# Patient Record
Sex: MALE | Race: BLACK OR AFRICAN AMERICAN | Employment: UNEMPLOYED | URBAN - METROPOLITAN AREA
[De-identification: names, ages, dates, MRNs, and addresses within clinical notes are randomized per-mention and may not be internally consistent; named-entity substitution may affect disease eponyms.]

---
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
## 2022-12-01 ENCOUNTER — APPOINTMENT (OUTPATIENT)
Dept: GENERAL RADIOLOGY | Age: 22
End: 2022-12-01
Payer: COMMERCIAL

## 2022-12-01 ENCOUNTER — HOSPITAL ENCOUNTER (EMERGENCY)
Age: 22
Discharge: HOME | End: 2022-12-01
Attending: EMERGENCY MEDICINE
Payer: COMMERCIAL

## 2022-12-01 VITALS
TEMPERATURE: 98.4 F | OXYGEN SATURATION: 100 % | SYSTOLIC BLOOD PRESSURE: 144 MMHG | DIASTOLIC BLOOD PRESSURE: 90 MMHG | RESPIRATION RATE: 12 BRPM | HEART RATE: 65 BPM

## 2022-12-01 DIAGNOSIS — R55 SYNCOPE AND COLLAPSE: Primary | ICD-10-CM

## 2022-12-01 LAB
ABSOLUTE EOS #: 0.04 K/UL (ref 0–0.44)
ABSOLUTE IMMATURE GRANULOCYTE: <0.03 K/UL (ref 0–0.3)
ABSOLUTE LYMPH #: 2.06 K/UL (ref 1.1–3.7)
ABSOLUTE MONO #: 0.39 K/UL (ref 0.1–1.2)
ANION GAP SERPL CALCULATED.3IONS-SCNC: 17 MMOL/L (ref 9–17)
BASOPHILS # BLD: 0 % (ref 0–2)
BASOPHILS ABSOLUTE: 0.03 K/UL (ref 0–0.2)
BUN BLDV-MCNC: 14 MG/DL (ref 6–20)
CALCIUM SERPL-MCNC: 9.5 MG/DL (ref 8.6–10.4)
CHLORIDE BLD-SCNC: 101 MMOL/L (ref 98–107)
CHP ED QC CHECK: YES
CO2: 17 MMOL/L (ref 20–31)
CREAT SERPL-MCNC: 0.76 MG/DL (ref 0.7–1.2)
EOSINOPHILS RELATIVE PERCENT: 1 % (ref 1–4)
GFR SERPL CREATININE-BSD FRML MDRD: >60 ML/MIN/1.73M2
GLUCOSE BLD-MCNC: 92 MG/DL
GLUCOSE BLD-MCNC: 92 MG/DL (ref 75–110)
GLUCOSE BLD-MCNC: 95 MG/DL (ref 70–99)
HCT VFR BLD CALC: 43.2 % (ref 40.7–50.3)
HEMOGLOBIN: 14 G/DL (ref 13–17)
IMMATURE GRANULOCYTES: 0 %
LYMPHOCYTES # BLD: 31 % (ref 24–43)
MCH RBC QN AUTO: 29.7 PG (ref 25.2–33.5)
MCHC RBC AUTO-ENTMCNC: 32.4 G/DL (ref 28.4–34.8)
MCV RBC AUTO: 91.7 FL (ref 82.6–102.9)
MONOCYTES # BLD: 6 % (ref 3–12)
NRBC AUTOMATED: 0 PER 100 WBC
PDW BLD-RTO: 14 % (ref 11.8–14.4)
PLATELET # BLD: 193 K/UL (ref 138–453)
PMV BLD AUTO: 10.6 FL (ref 8.1–13.5)
POTASSIUM SERPL-SCNC: 3.6 MMOL/L (ref 3.7–5.3)
RBC # BLD: 4.71 M/UL (ref 4.21–5.77)
SEG NEUTROPHILS: 62 % (ref 36–65)
SEGMENTED NEUTROPHILS ABSOLUTE COUNT: 4.16 K/UL (ref 1.5–8.1)
SODIUM BLD-SCNC: 135 MMOL/L (ref 135–144)
TROPONIN, HIGH SENSITIVITY: <6 NG/L (ref 0–22)
WBC # BLD: 6.7 K/UL (ref 3.5–11.3)

## 2022-12-01 PROCEDURE — 99284 EMERGENCY DEPT VISIT MOD MDM: CPT

## 2022-12-01 PROCEDURE — 84484 ASSAY OF TROPONIN QUANT: CPT

## 2022-12-01 PROCEDURE — 71046 X-RAY EXAM CHEST 2 VIEWS: CPT

## 2022-12-01 PROCEDURE — 85025 COMPLETE CBC W/AUTO DIFF WBC: CPT

## 2022-12-01 PROCEDURE — 82947 ASSAY GLUCOSE BLOOD QUANT: CPT

## 2022-12-01 PROCEDURE — 80048 BASIC METABOLIC PNL TOTAL CA: CPT

## 2022-12-01 PROCEDURE — 6370000000 HC RX 637 (ALT 250 FOR IP): Performed by: STUDENT IN AN ORGANIZED HEALTH CARE EDUCATION/TRAINING PROGRAM

## 2022-12-01 RX ORDER — ACETAMINOPHEN 500 MG
1000 TABLET ORAL ONCE
Status: COMPLETED | OUTPATIENT
Start: 2022-12-01 | End: 2022-12-01

## 2022-12-01 RX ADMIN — ACETAMINOPHEN 1000 MG: 500 TABLET ORAL at 19:39

## 2022-12-01 NOTE — Clinical Note
Macario Singh was seen and treated in our emergency department on 12/1/2022. He may return to work on 12/03/2022. Or sooner if he feels better     If you have any questions or concerns, please don't hesitate to call.       Joan Jacinto MD

## 2022-12-02 NOTE — ED PROVIDER NOTES
[facility]  ED  Emergency Department Encounter  Emergency Medicine Resident     Pt Name: Анна Hoover  MRN: [id number]  [place] 2000  Date of evaluation: 12/1/22  PCP:  No primary care provider on file. CHIEF COMPLAINT       Chief Complaint   Patient presents with    Loss of Consciousness     Was at work when he began to [facility] and then states he woke up on the ground       HISTORY OFPRESENT ILLNESS  (Location/Symptom, Timing/Onset, Context/Setting, Quality, Duration, Modifying Factors,Severity.)      Анна Hoover is a 25 y.o. male who presents with syncopal episode at work. He says that he all of a sudden felt lightheaded with tunnel vision and then collapsed. He was unconscious for 10 seconds. No seizure-like activity, incontinence, postictal period. No history of seizures. He says that he did not eat breakfast that morning and he drank 2 energy drinks which is abnormal for him. He denies any chest pain, shortness of breath, nausea, vomiting, fevers, chills, headache or blurred vision. PAST MEDICAL / SURGICAL / SOCIAL / FAMILY HISTORY      has a past medical history of Vision disturbance. has a past surgical history that includes Circumcision. Social:  reports that he is a non-smoker but has been exposed to tobacco smoke. He has never used smokeless tobacco. He reports current drug use. Drug: Marijuana Samanhta Pleas). He reports that he does not drink alcohol.     Family Hx:   Family History   Problem Relation Age of Onset    Cancer Maternal Grandfather     Early Death Maternal Grandfather         cancer 2006    High Blood Pressure Paternal Grandmother     High Blood Pressure Paternal Grandfather     Arthritis Neg Hx     Asthma Neg Hx     Birth Defects Neg Hx     Depression Neg Hx     Diabetes Neg Hx     Hearing Loss Neg Hx     Heart Disease Neg Hx     High Cholesterol Neg Hx     Kidney Disease Neg Hx     Learning Disabilities Neg Hx     Mental Illness Neg Hx     Mental Retardation Neg Hx     Miscarriages / Stillbirths Neg Hx     Stroke Neg Hx     Substance Abuse Neg Hx     Vision Loss Neg Hx         Allergies:  Patient has no known allergies. Home Medications:  Prior to Admission medications    Medication Sig Start Date End Date Taking? Authorizing Provider   benzoyl peroxide 5 % gel Apply topically daily. 8/21/15   KAILEE Kemp CNP   ibuprofen (ADVIL;MOTRIN) 600 MG tablet Take 1 tablet by mouth every 8 hours as needed for Pain 8/21/15   KAILEE Kemp CNP   hydrocortisone 1 % ointment Apply topically 2 times daily to affected skin on his back for no more than 2 weeks at a time. 8/21/15   KAILEE Kemp CNP   clotrimazole (LOTRIMIN AF) 1 % cream Apply topically 2 times daily to pale skin patches on his face. Avoid the eyes. Use for no more than 2 weeks. 5/30/14   KAILEE Kemp CNP       REVIEW OFSYSTEMS    (2-9 systems for level 4, 10 or more for level 5)      Review of Systems   Constitutional:  Negative for appetite change, chills, fatigue and fever. HENT:  Negative for congestion, rhinorrhea, sneezing and sore throat. Eyes:  Negative for visual disturbance. Respiratory:  Negative for cough and shortness of breath. Cardiovascular:  Negative for chest pain and leg swelling. Gastrointestinal:  Negative for abdominal pain, diarrhea, nausea and vomiting. Genitourinary:  Negative for dysuria. Musculoskeletal:  Negative for myalgias, neck pain and neck stiffness. Skin:  Negative for rash and wound. Neurological:  Positive for syncope. Negative for dizziness, seizures, light-headedness and headaches. Psychiatric/Behavioral:  Negative for dysphoric mood and suicidal ideas. PHYSICAL EXAM   (up to 7 for level 4, 8 or more forlevel 5)      INITIAL VITALS:   Vitals:    12/01/22 2037   BP:    Pulse: 65   Resp: 12   Temp:    SpO2: 100%        Physical Exam  Vitals and nursing note reviewed. Constitutional:       General: He is not in acute distress. Appearance: Normal appearance. He is normal weight. He is not ill-appearing, toxic-appearing or diaphoretic. HENT:      Nose: Nose normal.      Mouth/Throat:      Mouth: Mucous membranes are moist.      Pharynx: Oropharynx is clear. Eyes:      Extraocular Movements: Extraocular movements intact. Conjunctiva/sclera: Conjunctivae normal.      Pupils: Pupils are equal, round, and reactive to light. Cardiovascular:      Rate and Rhythm: Normal rate and regular rhythm. Pulses: Normal pulses. Heart sounds: Normal heart sounds. Pulmonary:      Effort: Pulmonary effort is normal.      Breath sounds: Normal breath sounds. Abdominal:      General: There is no distension. Palpations: Abdomen is soft. Tenderness: There is no abdominal tenderness. There is no right CVA tenderness, left CVA tenderness or guarding. Musculoskeletal:         General: Normal range of motion. Cervical back: Normal range of motion and neck supple. Skin:     General: Skin is warm. Capillary Refill: Capillary refill takes less than 2 seconds. Neurological:      General: No focal deficit present. Mental Status: He is alert and oriented to person, place, and time. Psychiatric:         Mood and Affect: Mood normal.         Behavior: Behavior normal.       MEDICAL DECISION MAKING     Initial MDM/Plan: 25 y.o. male who presents with syncopal episode at work. No seizure-like activity. Vital signs reviewed and are notable for hypertension, otherwise within normal limits. Physical examination showing no trauma, normal heart rate, clear lungs and no focal neurological deficits. Suspect vasovagal syncope, however will obtain CBC, BMP, glucose and troponin and will reassess.                DIAGNOSTIC RESULTS / EMERGENCYDEPARTMENT COURSE / MDM     LABS:  Labs Reviewed   BASIC METABOLIC PANEL - Abnormal; Notable for the following components: Result Value    Potassium 3.6 (*)     CO2 17 (*)     All other components within normal limits   POCT GLUCOSE - Normal   CBC WITH AUTO DIFFERENTIAL   TROPONIN   POC GLUCOSE FINGERSTICK         RADIOLOGY:  No results found. EMERGENCY DEPARTMENT COURSE:  Discussed with patient the incidental finding of tracheal deviation which requires further work-up and possible follow-up with ENT for concern for thyroid issue. Patient does have not have any symptoms or findings consistent with hyperthyroidism. Also discussed with patient likely vasovagal syncope and provide him with a work note with plan to return to work on 12/3. Patient decayed understanding and is medically stable for discharge at this time. PROCEDURES:  None    CONSULTS:  None      FINAL IMPRESSION      1.  Syncope and collapse          DISPOSITION / PLAN     DISPOSITION Decision To Discharge 12/01/2022 08:27:10 PM      PATIENT REFERRED TO:  [facility]  [address] [place] [address]  [phone]  Go to   If symptoms worsen    Dee Rojo MD  [address]  [phone]    Schedule an appointment as soon as possible for a visit   for ENT follow-up of thyroid concern found on chest x-ray    DISCHARGE MEDICATIONS:  Discharge Medication List as of 12/1/2022  8:30 PM          Kellen Hernandez MD  Emergency Medicine Resident    (Please note that portions of this note were completed with a voice recognition program.Efforts were made to edit the dictations but occasionally words are mis-transcribed.)        Kellen Hernandez MD  Resident  12/07/22 [id number]

## 2022-12-02 NOTE — ED PROVIDER NOTES
[facility]     Emergency Department     Faculty Attestation    I performed a history and physical examination of the patient and discussed management with the resident. I have reviewed and agree with the residents findings including all diagnostic interpretations, and treatment plans as written. Any areas of disagreement are noted on the chart. I was personally present for the key portions of any procedures. I have documented in the chart those procedures where I was not present during the key portions. I have reviewed the emergency nurses triage note. I agree with the chief complaint, past medical history, past surgical history, allergies, medications, social and family history as documented unless otherwise noted below. Documentation of the HPI, Physical Exam and Medical Decision Making performed by netteiblexy is based on my personal performance of the HPI, PE and MDM. For Physician Assistant/ Nurse Practitioner cases/documentation I have personally evaluated this patient and have completed at least one if not all key elements of the E/M (history, physical exam, and MDM). Additional findings are as noted. Patient was at work. Was standing upright for about 2 hours. Then went to walk. And felt very lightheaded and then had a syncopal episode where he went to the ground. No seizure activity no postictal state. Was back to baseline. Has not had a syncopal episode in the past.  Denies any family history of sudden cardiac death. Mom is present at the same time and denies any family history of cardiac death at young ages. Patient denied any chest pain or shortness of breath. He had only eaten breakfast at 7 AM earlier in the morning, and had a Snickers midday. Which he typically does not go that long without eating. No numbness, tingling or weakness, no nausea or vomiting.   Patient had a cousin pick him up from work and drove him to the emergency room    Patient on exam is well-appearing nontoxic speaking in full sentences with no respiratory distress, cardiac regular rate and rhythm no murmurs gallops or rubs, his abdomen is soft nondistended nontender palpation all quadrants no rebound or guarding noted, no neurodeficits noted on exam.    Patient with syncopal episode will check basic labs. And EKG. Rule out any dysrhythmias. Possible vasovagal syncope versus orthostatic. EKG Interpretation    Interpreted by me    EKG shows normal sinus rhythm with sinus arrhythmia normal axis, LVH is noted Q-wave noted in lead III no ST elevations no depressions. MT interval of 166 QRS of 90 and a QT corrected of 380.     Nelda Camp D.O, M.P.H  Attending Emergency Medicine Physician         Nelda Camp DO  12/01/22 2024

## 2022-12-02 NOTE — ED NOTES
Pt reports to the ED with complaints of +LOC PTA. Pt states he was at work all day and felt fine until about an hour PTA. Pt states he began to [employer] funny\" and went to look for his managers and the rest thing he remembers is waking up on the ground with people around him. Pt unsure if he hit his head but mother states she was told while he was down, he was shaking. Unknown how long pt was down. Pt denies any medical hx at this time or taking any meds at home. Pt states he did develop a mild HA afterwards but upon arrival it is even more mild. Pt does not have any other complaints at this time. Pt is A&O x4 and speaking in complete sentences. Pt is resting in bed comfortably, NAD noted. Pt denies chest pain, SOB, N/V/D, urinary symptoms. EKG obtained.  Pt placed on full cardiac monitor       Yanira Squires RN  12/01/22 2053
Pt to evaristo Tavarez RN  12/01/22 [id number]
The following labs were labeled with appropriate pt sticker and tubed to lab:     [x] Blue     [x] Lavender   [] on ice  [x] Green/yellow  [] Green/black [] on ice  [] Lonie Scales  [] on ice  [] Yellow  [x] Red  [] Type/ Screen  [] ABG  [] VBG    [] COVID-19 swab    [] Rapid  [] PCR  [] Flu swab  [] Peds Viral Panel     [] Urine Sample  [] Fecal Sample  [] Pelvic Cultures  [] Blood Cultures  [] X 2  [] STREP Cultures       Harjeet Lowe RN  12/01/22 1940
[phone]

## 2022-12-02 NOTE — DISCHARGE INSTRUCTIONS
Mable Slaughter!!!    From [facility]    On behalf of the Emergency Department staff at [facility]. [facility], I would like to thank you for giving [facility] the opportunity to address your health care needs and concerns. We hope that during your visit, our service was delivered in a professional and caring manner. Please keep [facility] in mind as we walk with you down the path to your own personal wellness. Please expect an automated phone call from [phone] so we can ask a few questions about your health and progress. Based on your answers, a clinician may call you back to offer help and instructions. If you notice any concerning symptoms please return to the ER immediately. These can include but are not limited to: fevers, chills, shortness of breath, vomiting, weakness of the extremities, changes in your mental status, numbness, pale extremities, or chest pain.

## 2022-12-07 ASSESSMENT — ENCOUNTER SYMPTOMS
NAUSEA: 0
COUGH: 0
DIARRHEA: 0
VOMITING: 0
ABDOMINAL PAIN: 0
RHINORRHEA: 0
SHORTNESS OF BREATH: 0
SORE THROAT: 0

## 2024-04-18 ENCOUNTER — HOSPITAL ENCOUNTER (EMERGENCY)
Age: 24
Discharge: HOME | End: 2024-04-18
Attending: EMERGENCY MEDICINE
Payer: COMMERCIAL

## 2024-04-18 ENCOUNTER — APPOINTMENT (OUTPATIENT)
Dept: ULTRASOUND IMAGING | Age: 24
End: 2024-04-18
Payer: COMMERCIAL

## 2024-04-18 VITALS
OXYGEN SATURATION: 100 % | BODY MASS INDEX: 22.31 KG/M2 | SYSTOLIC BLOOD PRESSURE: 142 MMHG | HEIGHT: 71 IN | TEMPERATURE: 97.5 F | DIASTOLIC BLOOD PRESSURE: 76 MMHG | RESPIRATION RATE: 16 BRPM | WEIGHT: 159.39 LBS | HEART RATE: 117 BPM

## 2024-04-18 DIAGNOSIS — N50.3 EPIDIDYMAL CYST: Primary | ICD-10-CM

## 2024-04-18 LAB
BACTERIA URNS QL MICRO: NORMAL
BILIRUB UR QL STRIP: NEGATIVE
CASTS #/AREA URNS LPF: NORMAL /LPF (ref 0–8)
CLARITY UR: CLEAR
COLOR UR: YELLOW
EPI CELLS #/AREA URNS HPF: NORMAL /HPF (ref 0–5)
GLUCOSE UR STRIP-MCNC: NEGATIVE MG/DL
HGB UR QL STRIP.AUTO: NEGATIVE
KETONES UR STRIP-MCNC: ABNORMAL MG/DL
LEUKOCYTE ESTERASE UR QL STRIP: NEGATIVE
NITRITE UR QL STRIP: NEGATIVE
PH UR STRIP: 6.5 [PH] (ref 5–8)
PROT UR STRIP-MCNC: ABNORMAL MG/DL
RBC #/AREA URNS HPF: NORMAL /HPF (ref 0–4)
SP GR UR STRIP: 1.03 (ref 1–1.03)
UROBILINOGEN UR STRIP-ACNC: NORMAL EU/DL (ref 0–1)
WBC #/AREA URNS HPF: NORMAL /HPF (ref 0–5)

## 2024-04-18 PROCEDURE — 76870 US EXAM SCROTUM: CPT

## 2024-04-18 PROCEDURE — 6370000000 HC RX 637 (ALT 250 FOR IP)

## 2024-04-18 PROCEDURE — 87491 CHLMYD TRACH DNA AMP PROBE: CPT

## 2024-04-18 PROCEDURE — 81001 URINALYSIS AUTO W/SCOPE: CPT

## 2024-04-18 PROCEDURE — 87591 N.GONORRHOEAE DNA AMP PROB: CPT

## 2024-04-18 PROCEDURE — 99284 EMERGENCY DEPT VISIT MOD MDM: CPT

## 2024-04-18 RX ORDER — IBUPROFEN 800 MG/1
800 TABLET ORAL ONCE
Status: COMPLETED | OUTPATIENT
Start: 2024-04-18 | End: 2024-04-18

## 2024-04-18 RX ADMIN — IBUPROFEN 800 MG: 800 TABLET, FILM COATED ORAL at 16:12

## 2024-04-18 ASSESSMENT — PAIN - FUNCTIONAL ASSESSMENT: PAIN_FUNCTIONAL_ASSESSMENT: 0-10

## 2024-04-18 ASSESSMENT — ENCOUNTER SYMPTOMS
CHEST TIGHTNESS: 0
ABDOMINAL PAIN: 0
VOMITING: 0
NAUSEA: 0
SHORTNESS OF BREATH: 0

## 2024-04-18 ASSESSMENT — PAIN SCALES - GENERAL
PAINLEVEL_OUTOF10: 2
PAINLEVEL_OUTOF10: 1

## 2024-04-18 NOTE — ED NOTES
Pt reports to the ED with complaints of testicular pain for a few days. Pt states he feels his scrotum is swollen. Pt denies any urinary symptoms at this time. Pt is tachycardic upon arrival. Pt is A&O x4 and speaking in complete sentences. Pt is anxious at this time but NAD noted. Pt denies chest pain or SOB. Care ongoing

## 2024-04-18 NOTE — ED PROVIDER NOTES
[facility]     Emergency Department     Faculty Attestation    I performed a history and physical examination of the patient and discussed management with the resident. I reviewed the resident’s note and agree with the documented findings and plan of care. Any areas of disagreement are noted on the chart. I was personally present for the key portions of any procedures. I have documented in the chart those procedures where I was not present during the key portions. I have reviewed the emergency nurses triage note. I agree with the chief complaint, past medical history, past surgical history, allergies, medications, social and family history as documented unless otherwise noted below. Documentation of the HPI, Physical Exam and Medical Decision Making performed by medical students or scribes is based on my personal performance of the HPI, PE and MDM. For Physician Assistant/ Nurse Practitioner cases/documentation I have personally evaluated this patient and have completed at least one if not all key elements of the E/M (history, physical exam, and MDM). Additional findings are as noted.    Vital signs:   Vitals:    04/18/24 1515   BP: (!) 142/76   Pulse: (!) 114   Resp: 16   Temp: 97.5 °F (36.4 °C)   SpO2: 100%      Testicles are non-tender to palpation. Normal texture. No scrotal erythema. Phallus normal. No discharge.             Valentina Ferguson M.D,  Attending Emergency  Physician            Valentina Ferguson MD  04/18/24 6452

## 2024-04-18 NOTE — ED PROVIDER NOTES
[facility] ED  Emergency Department Encounter  Emergency Medicine Resident     Pt Name:Fernanda Ramos  MRN: [id number]  Birthdate 2000  Date of evaluation: 4/18/24  PCP:  No primary care provider on file.  Note Started: 3:27 PM EDT      CHIEF COMPLAINT       Chief Complaint   Patient presents with    Testicle Pain       HISTORY OF PRESENT ILLNESS  (Location/Symptom, Timing/Onset, Context/Setting, Quality, Duration, Modifying Factors, Severity.)      Fernanda Ramos is a 24 y.o. male who presents with testicular pain in his bilateral testicles for the past year.  Patient states that the pain intermittently comes and goes, affects only 1 testicle at the time, and states he feels like he notices some swelling along with the pain.  Patient states the pain is a 1/10 in intensity and feels like a dull ache.  He denies any abdominal pain, nausea or vomiting associated with it.  He states that he has not been sexually active for the past year, denies any intercourse.  Patient states that he was tested for gonorrhea chlamydia and HIV in 2019 which were negative.  He was prophylactically treated for gonorrhea and chlamydia at that time.  Patient denies any penile lesions, scrotal lesions, genital sores, or treatment for rash.  He denies any pruritus.  He denies any chest pain, shortness of breath, abdominal pain, nausea, vomiting, fevers, chills, weight loss, or any other symptoms.    PAST MEDICAL / SURGICAL / SOCIAL / FAMILY HISTORY      has a past medical history of Vision disturbance.       has a past surgical history that includes Circumcision.      Social History     Socioeconomic History    Marital status: Single     Spouse name: Not on file    Number of children: Not on file    Years of education: Not on file    Highest education level: Not on file   Occupational History    Not on file   Tobacco Use    Smoking status: Passive Smoke Exposure - Never Smoker    Smokeless tobacco: Never   Substance

## 2024-04-18 NOTE — DISCHARGE INSTRUCTIONS
You were seen in the urgency department today for testicular pain.  We obtained a testicular ultrasound which showed a 7 mm cyst in your right epididymis.  There is nothing that we need to do acutely for this, this is a normal finding that does not cause any injury to you. You can take Tylenol and Motrin as needed for the pain. You will need to follow-up with a urologist so that they can further monitor your cyst and determine if you will need a repeat ultrasound. Please return to the ED if you develop any worsening testicular pain or swelling, penile discharge, penile or scrotal lesions, or any other symptoms.

## 2024-04-19 LAB
CHLAMYDIA DNA UR QL NAA+PROBE: NEGATIVE
N GONORRHOEA DNA UR QL NAA+PROBE: NEGATIVE
SPECIMEN DESCRIPTION: NORMAL

## 2024-06-28 ENCOUNTER — OFFICE VISIT (OUTPATIENT)
Dept: UROLOGY | Age: 24
End: 2024-06-28
Payer: COMMERCIAL

## 2024-06-28 VITALS
BODY MASS INDEX: 22.26 KG/M2 | HEART RATE: 68 BPM | WEIGHT: 159 LBS | DIASTOLIC BLOOD PRESSURE: 76 MMHG | SYSTOLIC BLOOD PRESSURE: 119 MMHG | HEIGHT: 71 IN

## 2024-06-28 DIAGNOSIS — I86.1 VARICOCELE: ICD-10-CM

## 2024-06-28 DIAGNOSIS — N50.3 EPIDIDYMAL CYST: Primary | ICD-10-CM

## 2024-06-28 PROCEDURE — G8420 CALC BMI NORM PARAMETERS: HCPCS | Performed by: UROLOGY

## 2024-06-28 PROCEDURE — 1036F TOBACCO NON-USER: CPT | Performed by: UROLOGY

## 2024-06-28 PROCEDURE — 99204 OFFICE O/P NEW MOD 45 MIN: CPT | Performed by: UROLOGY

## 2024-06-28 PROCEDURE — G8427 DOCREV CUR MEDS BY ELIG CLIN: HCPCS | Performed by: UROLOGY

## 2024-06-28 NOTE — PROGRESS NOTES
Dr. Madi Glez Jr, MD MD  [facility] Consultation / New Patient Visit    Patient:  Fernanda Ramos  YOB: 2000  Date: 6/28/2024  Consult requested from No primary care provider on file.     HISTORY OF PRESENT ILLNESS:   The patient is a 24 y.o. male who presents today for follow-up for the following problem(s): epididymal head cyst  Overall the problem(s) : are worsening.  Associated Symptoms: No dysuria, gross hematuria.   Pain Severity: Pain Score:   0 - No pain    Today visit:   6/28/24  Epidiymal head cyst - 7 mm  Varicocele - grade 1, states he has pain with standing and playing     We reviewed the patients old notes, labs, and imaging independently.     Summary of old records:   (Patient's old records, notes and chart reviewed and summarized above.)    Last several PSA's:  No results found for: \"PSA\"    Last total testosterone:  No results found for: \"TESTOSTERONE\"    Urinalysis today:  No results found for this visit on 06/28/24.      Last BUN and creatinine:  Lab Results   Component Value Date    BUN 14 12/01/2022     Lab Results   Component Value Date    CREATININE 0.76 12/01/2022       Imaging Reviewed during this Office Visit:   (results were independently reviewed by physician and radiology report verified)    PAST MEDICAL, FAMILY AND SOCIAL HISTORY:  Past Medical History:   Diagnosis Date    Vision disturbance 9/6/2016     Past Surgical History:   Procedure Laterality Date    CIRCUMCISION       Family History   Problem Relation Age of Onset    Cancer Maternal Grandfather     Early Death Maternal Grandfather         cancer 2006    High Blood Pressure Paternal Grandmother     High Blood Pressure Paternal Grandfather     Arthritis Neg Hx     Asthma Neg Hx     Birth Defects Neg Hx     Depression Neg Hx     Diabetes Neg Hx     Hearing Loss Neg Hx     Heart Disease Neg Hx     High Cholesterol Neg Hx     Kidney Disease Neg Hx     Learning Disabilities Neg Hx     Mental

## 2025-07-17 ENCOUNTER — HOSPITAL ENCOUNTER (EMERGENCY)
Age: 25
Discharge: LEFT BEFORE DISCHARGE | End: 2025-07-18
Attending: EMERGENCY MEDICINE
Payer: COMMERCIAL

## 2025-07-17 ENCOUNTER — APPOINTMENT (OUTPATIENT)
Dept: GENERAL RADIOLOGY | Age: 25
End: 2025-07-17
Payer: COMMERCIAL

## 2025-07-17 DIAGNOSIS — R55 SYNCOPE AND COLLAPSE: Primary | ICD-10-CM

## 2025-07-17 LAB
ALBUMIN SERPL-MCNC: 4.4 G/DL (ref 3.5–5.2)
ALBUMIN/GLOB SERPL: 1.8 {RATIO} (ref 1–2.5)
ALP SERPL-CCNC: 52 U/L (ref 40–129)
ALT SERPL-CCNC: 27 U/L (ref 10–50)
ANION GAP SERPL CALCULATED.3IONS-SCNC: 12 MMOL/L (ref 9–16)
AST SERPL-CCNC: 21 U/L (ref 10–50)
BASOPHILS # BLD: <0.03 K/UL (ref 0–0.2)
BASOPHILS NFR BLD: 0 % (ref 0–2)
BILIRUB SERPL-MCNC: 0.5 MG/DL (ref 0–1.2)
BUN SERPL-MCNC: 19 MG/DL (ref 6–20)
CALCIUM SERPL-MCNC: 8.8 MG/DL (ref 8.6–10.4)
CHLORIDE SERPL-SCNC: 104 MMOL/L (ref 98–107)
CO2 SERPL-SCNC: 23 MMOL/L (ref 20–31)
CREAT SERPL-MCNC: 1.1 MG/DL (ref 0.7–1.2)
EOSINOPHIL # BLD: 0.09 K/UL (ref 0–0.44)
EOSINOPHILS RELATIVE PERCENT: 1 % (ref 1–4)
ERYTHROCYTE [DISTWIDTH] IN BLOOD BY AUTOMATED COUNT: 13.6 % (ref 11.8–14.4)
GFR, ESTIMATED: >90 ML/MIN/1.73M2
GLUCOSE SERPL-MCNC: 86 MG/DL (ref 74–99)
HCT VFR BLD AUTO: 37.5 % (ref 40.7–50.3)
HGB BLD-MCNC: 12.4 G/DL (ref 13–17)
IMM GRANULOCYTES # BLD AUTO: <0.03 K/UL (ref 0–0.3)
IMM GRANULOCYTES NFR BLD: 0 %
LYMPHOCYTES NFR BLD: 3.67 K/UL (ref 1.1–3.7)
LYMPHOCYTES RELATIVE PERCENT: 47 % (ref 24–43)
MCH RBC QN AUTO: 29.7 PG (ref 25.2–33.5)
MCHC RBC AUTO-ENTMCNC: 33.1 G/DL (ref 28.4–34.8)
MCV RBC AUTO: 89.7 FL (ref 82.6–102.9)
MONOCYTES NFR BLD: 0.65 K/UL (ref 0.1–1.2)
MONOCYTES NFR BLD: 8 % (ref 3–12)
NEUTROPHILS NFR BLD: 44 % (ref 36–65)
NEUTS SEG NFR BLD: 3.5 K/UL (ref 1.5–8.1)
NRBC BLD-RTO: 0 PER 100 WBC
PLATELET # BLD AUTO: 217 K/UL (ref 138–453)
PMV BLD AUTO: 10.5 FL (ref 8.1–13.5)
POTASSIUM SERPL-SCNC: 3.1 MMOL/L (ref 3.7–5.3)
PROT SERPL-MCNC: 6.8 G/DL (ref 6.6–8.7)
RBC # BLD AUTO: 4.18 M/UL (ref 4.21–5.77)
SODIUM SERPL-SCNC: 139 MMOL/L (ref 136–145)
TROPONIN I SERPL HS-MCNC: <6 NG/L (ref 0–22)
TROPONIN I SERPL HS-MCNC: <6 NG/L (ref 0–22)
WBC OTHER # BLD: 7.9 K/UL (ref 3.5–11.3)

## 2025-07-17 PROCEDURE — 99285 EMERGENCY DEPT VISIT HI MDM: CPT

## 2025-07-17 PROCEDURE — 2580000003 HC RX 258

## 2025-07-17 PROCEDURE — 85025 COMPLETE CBC W/AUTO DIFF WBC: CPT

## 2025-07-17 PROCEDURE — 80053 COMPREHEN METABOLIC PANEL: CPT

## 2025-07-17 PROCEDURE — 93005 ELECTROCARDIOGRAM TRACING: CPT

## 2025-07-17 PROCEDURE — 6370000000 HC RX 637 (ALT 250 FOR IP)

## 2025-07-17 PROCEDURE — 84484 ASSAY OF TROPONIN QUANT: CPT

## 2025-07-17 PROCEDURE — 71045 X-RAY EXAM CHEST 1 VIEW: CPT

## 2025-07-17 RX ORDER — POTASSIUM CHLORIDE 1500 MG/1
40 TABLET, EXTENDED RELEASE ORAL ONCE
Status: COMPLETED | OUTPATIENT
Start: 2025-07-17 | End: 2025-07-17

## 2025-07-17 RX ORDER — 0.9 % SODIUM CHLORIDE 0.9 %
1000 INTRAVENOUS SOLUTION INTRAVENOUS ONCE
Status: COMPLETED | OUTPATIENT
Start: 2025-07-17 | End: 2025-07-17

## 2025-07-17 RX ADMIN — POTASSIUM CHLORIDE 40 MEQ: 1500 TABLET, EXTENDED RELEASE ORAL at 23:47

## 2025-07-17 RX ADMIN — SODIUM CHLORIDE 1000 ML: 9 INJECTION, SOLUTION INTRAVENOUS at 21:25

## 2025-07-17 ASSESSMENT — PAIN SCALES - GENERAL: PAINLEVEL_OUTOF10: 0

## 2025-07-17 ASSESSMENT — LIFESTYLE VARIABLES
HOW OFTEN DO YOU HAVE A DRINK CONTAINING ALCOHOL: MONTHLY OR LESS
HOW MANY STANDARD DRINKS CONTAINING ALCOHOL DO YOU HAVE ON A TYPICAL DAY: 1 OR 2

## 2025-07-18 ENCOUNTER — APPOINTMENT (OUTPATIENT)
Dept: CT IMAGING | Age: 25
End: 2025-07-18
Payer: COMMERCIAL

## 2025-07-18 VITALS
DIASTOLIC BLOOD PRESSURE: 75 MMHG | TEMPERATURE: 97.8 F | SYSTOLIC BLOOD PRESSURE: 124 MMHG | HEART RATE: 81 BPM | OXYGEN SATURATION: 94 % | RESPIRATION RATE: 13 BRPM

## 2025-07-18 LAB
EKG ATRIAL RATE: 94 BPM
EKG P AXIS: 43 DEGREES
EKG P-R INTERVAL: 170 MS
EKG Q-T INTERVAL: 334 MS
EKG QRS DURATION: 92 MS
EKG QTC CALCULATION (BAZETT): 417 MS
EKG R AXIS: 55 DEGREES
EKG T AXIS: 37 DEGREES
EKG VENTRICULAR RATE: 94 BPM

## 2025-07-18 NOTE — ED PROVIDER NOTES
[facility]   Emergency Department  Emergency Medicine Attending Sign-out   Note started: 11:10 PM EDT    Care of Fernanda Ramos was assumed from previous attending Dr. Wallace at 11 and is being seen for Loss of Consciousness  .  The patient's initial evaluation and plan have been discussed with the prior provider who initially evaluated the patient.     Attestation  I was available and discussed any additional care issues that arose and coordinated the management plans with the resident(s) caring for the patient during my duty period. Any areas of disagreement with resident's documentation of care or procedures are noted on the chart. I was personally present for the key portions of any/all procedures, during my duty period. I have documented in the chart those procedures where I was not present during the key portions.     BRIEF PATIENT SUMMARY/MDM COURSE PER INITIAL PROVIDER:   RECENT VITALS:     Temp: 97.8 °F (36.6 °C),  Pulse: 81, Respirations: 22, BP: 116/80, SpO2: 98 %    This patient is a 25 y.o. Male with sudden onset flushed feeling and warm.  Monoxide syncopized.  Was witnessed.  No seizure activity.  EKG normal.  Labs with potassium of 3.1 but did give hit his head and has an abrasion.    DIAGNOSTICS/MEDICATIONS:     MEDICATIONS GIVEN:  ED Medication Orders (From admission, onward)      Start Ordered     Status Ordering Provider    07/17/25 2215 07/17/25 2210  potassium chloride (KLOR-CON M) extended release tablet 40 mEq  ONCE         Acknowledged DARION VALDEZ    07/17/25 2115 07/17/25 2113  sodium chloride 0.9 % bolus 1,000 mL  ONCE         Last MAR action: Stopped - by SHON FIELDS on 07/17/25 at 2220 DARION VALDEZ            LABS    Labs Reviewed   CBC WITH AUTO DIFFERENTIAL - Abnormal; Notable for the following components:       Result Value    RBC 4.18 (*)     Hemoglobin 12.4 (*)     Hematocrit 37.5 (*)     Lymphocytes % 47 (*)     All other components within normal

## 2025-07-18 NOTE — DISCHARGE INSTRUCTIONS
He was seen in the ED for syncope and collapse.    We recommend that you stay for further evaluation with the help of imaging-CT head and neck.  You have decided to leave AGAINST MEDICAL ADVICE at this time.    Based on the chest x-ray it is recommended that you get an ultrasound of the thyroid gland in the outpatient setting.  You may schedule an appointment with [facility] in the outpatient setting as soon as possible within 3 days.    If you experience any chest pain, recurrent episodes of passing out, shortness of breath, dizziness, lightheadedness, blurry vision or any weakness please immediately return to the emergency department.

## 2025-07-18 NOTE — ED PROVIDER NOTES
[facility] EMERGENCY DEPARTMENT  Emergency Department Encounter  Emergency Medicine Resident     Pt Name:Fernanda Ramos  MRN: [id number]  Birthdate 2000  Date of evaluation: 7/17/25  PCP:  No primary care provider on file.  Note Started: 11:56 PM EDT      CHIEF COMPLAINT       Chief Complaint   Patient presents with    Loss of Consciousness       HISTORY OF PRESENT ILLNESS  (Location/Symptom, Timing/Onset, Context/Setting, Quality, Duration, Modifying Factors, Severity.)      Fernanda Ramos is a 25 y.o. male who presents with syncope and collapse.  Patient was at home earlier today when he was in the kitchen and had a suddenly felt warm and sweaty.  Patient subsequently went outside to get some air when he suddenly took a deep breath.  Patient noticed to pass out, collapsed, struck the front of his head against the concrete with LOC.  This is all noticed by the sibling.  Patient otherwise did not have any loss of urinary/bowel incontinence.  No history of cardiac disease or sudden cardiac death in family.  Denies any chest pain, history of seizures    PAST MEDICAL / SURGICAL / SOCIAL / FAMILY HISTORY      has a past medical history of Vision disturbance.       has a past surgical history that includes Circumcision.      Social History     Socioeconomic History    Marital status: Single     Spouse name: Not on file    Number of children: Not on file    Years of education: Not on file    Highest education level: Not on file   Occupational History    Not on file   Tobacco Use    Smoking status: Passive Smoke Exposure - Never Smoker    Smokeless tobacco: Never   Substance and Sexual Activity    Alcohol use: No    Drug use: Yes     Types: Marijuana (Weed)    Sexual activity: Yes     Partners: Female   Other Topics Concern    Not on file   Social History Narrative    Not on file     Social Drivers of Health     Financial Resource Strain: Not on file   Food Insecurity: Not on file   Transportation Needs: Not on

## 2025-07-18 NOTE — ED PROVIDER NOTES
[facility] EMERGENCY DEPARTMENT     Emergency Department     Faculty Attestation    I performed a history and physical examination of the patient and discussed management with the resident. I reviewed the resident’s note and agree with the documented findings and plan of care. Any areas of disagreement are noted on the chart. I was personally present for the key portions of any procedures. I have documented in the chart those procedures where I was not present during the key portions. I have reviewed the emergency nurses triage note. I agree with the chief complaint, past medical history, past surgical history, allergies, medications, social and family history as documented unless otherwise noted below. For Physician Assistant/ Nurse Practitioner cases/documentation I have personally evaluated this patient and have completed at least one if not all key elements of the E/M (history, physical exam, and MDM). Additional findings are as noted.    Note Started: 9:03 PM EDT    Patient here by EMS provides independent history as well as patient's significant other and mom who provide independent history.  Witnessed syncopal episode.  Patient states he felt suddenly flushed warm diaphoretic went outside to get some air and the next thing he knew family was standing around him.  No prior history of syncope no history of seizures no family history of sudden cardiac death per mom.  Feels back to normal now.  On exam nontoxic well-appearing chatting with family.  Small abrasion on the forehead.  No midline neck tenderness.  Lungs clear heart regular no murmur equal radial pedal pulses.  Normal mental status pupil speech no deficits.  Will proceed with syncope workup imaging giving head injury reevaluate need for observation stay    EKG interpretation: Sinus rhythm 94 normal intervals normal axis no acute ST or T changes no acute findings      Critical Care     none    Nick Wallace MD, FACEP  Attending Emergency

## 2025-08-08 ENCOUNTER — HOSPITAL ENCOUNTER (OUTPATIENT)
Age: 25
Setting detail: SPECIMEN
Discharge: HOME | End: 2025-08-08

## 2025-08-08 ENCOUNTER — OFFICE VISIT (OUTPATIENT)
Age: 25
End: 2025-08-08
Payer: COMMERCIAL

## 2025-08-08 VITALS
DIASTOLIC BLOOD PRESSURE: 87 MMHG | HEIGHT: 71 IN | TEMPERATURE: 97.5 F | WEIGHT: 169 LBS | HEART RATE: 91 BPM | OXYGEN SATURATION: 98 % | SYSTOLIC BLOOD PRESSURE: 126 MMHG | RESPIRATION RATE: 18 BRPM | BODY MASS INDEX: 23.66 KG/M2

## 2025-08-08 DIAGNOSIS — Z11.59 ENCOUNTER FOR HEPATITIS C SCREENING TEST FOR LOW RISK PATIENT: ICD-10-CM

## 2025-08-08 DIAGNOSIS — E04.9 THYROID ENLARGEMENT: Primary | ICD-10-CM

## 2025-08-08 DIAGNOSIS — E04.9 THYROID ENLARGEMENT: ICD-10-CM

## 2025-08-08 LAB
HCV AB SERPL QL IA: NONREACTIVE
T3FREE SERPL-MCNC: 3.46 PG/ML (ref 2–4.4)
T4 FREE SERPL-MCNC: 1.5 NG/DL (ref 0.92–1.68)
TSH SERPL DL<=0.05 MIU/L-ACNC: 2.78 UIU/ML (ref 0.27–4.2)

## 2025-08-08 PROCEDURE — 99203 OFFICE O/P NEW LOW 30 MIN: CPT

## 2025-08-08 SDOH — ECONOMIC STABILITY: FOOD INSECURITY: WITHIN THE PAST 12 MONTHS, YOU WORRIED THAT YOUR FOOD WOULD RUN OUT BEFORE YOU GOT MONEY TO BUY MORE.: NEVER TRUE

## 2025-08-08 SDOH — ECONOMIC STABILITY: FOOD INSECURITY: WITHIN THE PAST 12 MONTHS, THE FOOD YOU BOUGHT JUST DIDN'T LAST AND YOU DIDN'T HAVE MONEY TO GET MORE.: NEVER TRUE

## 2025-08-08 ASSESSMENT — ENCOUNTER SYMPTOMS
STRIDOR: 0
SHORTNESS OF BREATH: 0
VOICE CHANGE: 0
TROUBLE SWALLOWING: 0
COUGH: 0
DIARRHEA: 0
BLOOD IN STOOL: 0
CHEST TIGHTNESS: 0
WHEEZING: 0
RESPIRATORY NEGATIVE: 1
SINUS PRESSURE: 0
CONSTIPATION: 0
VOMITING: 0
ABDOMINAL DISTENTION: 0
APNEA: 0
CHOKING: 0
COLOR CHANGE: 0
GASTROINTESTINAL NEGATIVE: 1

## 2025-08-08 ASSESSMENT — PATIENT HEALTH QUESTIONNAIRE - PHQ9
SUM OF ALL RESPONSES TO PHQ QUESTIONS 1-9: 2
SUM OF ALL RESPONSES TO PHQ QUESTIONS 1-9: 2
DEPRESSION UNABLE TO ASSESS: PT REFUSES
SUM OF ALL RESPONSES TO PHQ QUESTIONS 1-9: 2
2. FEELING DOWN, DEPRESSED OR HOPELESS: SEVERAL DAYS
SUM OF ALL RESPONSES TO PHQ QUESTIONS 1-9: 2
1. LITTLE INTEREST OR PLEASURE IN DOING THINGS: SEVERAL DAYS

## 2025-08-11 LAB — THYROPEROXIDASE AB SERPL IA-ACNC: <4 IU/ML (ref 0–25)

## 2025-08-15 ENCOUNTER — HOSPITAL ENCOUNTER (OUTPATIENT)
Dept: ULTRASOUND IMAGING | Age: 25
Discharge: HOME | End: 2025-08-17
Payer: COMMERCIAL

## 2025-08-15 DIAGNOSIS — E04.9 THYROID ENLARGEMENT: ICD-10-CM

## 2025-08-15 PROCEDURE — 76536 US EXAM OF HEAD AND NECK: CPT
